# Patient Record
Sex: MALE | NOT HISPANIC OR LATINO | URBAN - METROPOLITAN AREA
[De-identification: names, ages, dates, MRNs, and addresses within clinical notes are randomized per-mention and may not be internally consistent; named-entity substitution may affect disease eponyms.]

---

## 2021-10-23 ENCOUNTER — EMERGENCY (EMERGENCY)
Facility: HOSPITAL | Age: 46
LOS: 1 days | Discharge: HOME | End: 2021-10-23
Attending: EMERGENCY MEDICINE
Payer: SELF-PAY

## 2021-10-23 VITALS
HEIGHT: 67 IN | OXYGEN SATURATION: 98 % | WEIGHT: 145.06 LBS | TEMPERATURE: 98 F | RESPIRATION RATE: 18 BRPM | DIASTOLIC BLOOD PRESSURE: 103 MMHG | SYSTOLIC BLOOD PRESSURE: 199 MMHG | HEART RATE: 93 BPM

## 2021-10-23 VITALS
OXYGEN SATURATION: 99 % | SYSTOLIC BLOOD PRESSURE: 165 MMHG | DIASTOLIC BLOOD PRESSURE: 91 MMHG | HEART RATE: 81 BPM | RESPIRATION RATE: 18 BRPM

## 2021-10-23 PROCEDURE — 99284 EMERGENCY DEPT VISIT MOD MDM: CPT

## 2021-10-23 PROCEDURE — 99283 EMERGENCY DEPT VISIT LOW MDM: CPT

## 2021-10-23 RX ORDER — OXYCODONE HYDROCHLORIDE 5 MG/1
5 TABLET ORAL ONCE
Refills: 0 | Status: DISCONTINUED | OUTPATIENT
Start: 2021-10-23 | End: 2021-10-23

## 2021-10-23 RX ORDER — OXYCODONE HYDROCHLORIDE 5 MG/1
1 TABLET ORAL
Qty: 5 | Refills: 0
Start: 2021-10-23 | End: 2021-10-23

## 2021-10-23 RX ORDER — ACETAMINOPHEN 500 MG
650 TABLET ORAL ONCE
Refills: 0 | Status: COMPLETED | OUTPATIENT
Start: 2021-10-23 | End: 2021-10-23

## 2021-10-23 RX ORDER — CHLORHEXIDINE GLUCONATE 213 G/1000ML
15 SOLUTION TOPICAL
Qty: 450 | Refills: 0
Start: 2021-10-23 | End: 2021-11-06

## 2021-10-23 RX ORDER — IBUPROFEN 200 MG
600 TABLET ORAL ONCE
Refills: 0 | Status: COMPLETED | OUTPATIENT
Start: 2021-10-23 | End: 2021-10-23

## 2021-10-23 RX ADMIN — OXYCODONE HYDROCHLORIDE 5 MILLIGRAM(S): 5 TABLET ORAL at 19:16

## 2021-10-23 RX ADMIN — Medication 650 MILLIGRAM(S): at 19:17

## 2021-10-23 RX ADMIN — Medication 600 MILLIGRAM(S): at 22:15

## 2021-10-23 RX ADMIN — Medication 1 TABLET(S): at 22:06

## 2021-10-23 NOTE — ED PROVIDER NOTE - NS ED ROS FT
CONSTITUTIONAL: No fevers, no chills, no lightheadedness, no dizziness  EARS: no ear drainage, no ear pain, no change in hearing  NOSE: no nasal congestion  MOUTH/THROAT: +R lower molar pain, no sore throat  CV: No chest pain, no palpitations  RESP: No SOB, no cough  GI: No n/v  SKIN: no rashes, no facial swelling   NEURO: no headache, no decreased sensation/paresthesias

## 2021-10-23 NOTE — ED ADULT NURSE NOTE - OBJECTIVE STATEMENT
45 YO male no pertinent medical hx c/o R-sided dental pain. Patient reports that pain has been going on since Tuesday, has not seen a dentist yet. Reports that pain increased in severity today, took a total of 6 advil today, last at approx 1630 with no relief. denies chest pain, SOB, HA, N/V/D, abdominal pain, fever/chills, urinary symptoms, hematuria, weakness, dizziness, numbness, tinging. Peripheral pulses present b/l. Skin warm, dry and pink. Pt placed in position of comfort. Pt educated on call bell system and provided call bell. Bed in lowest position, wheels locked, appropriate side rails raised. Pt denies needs at this time.

## 2021-10-23 NOTE — ED PROVIDER NOTE - PROGRESS NOTE DETAILS
Attending MD Carrero: Dental in Emergency Department to see patient Attending MD Carrero: Will Rx 1 day of Oxycodone, patient to follow up with dental on Monday.  First dose Augmentin in Emergency Department.  Stable for discharge. Follow up instructions given, importance of follow up emphasized, return to ED parameters reviewed and patient verbalized understanding.  All questions answered, all concerns addressed. Thalia MALDONADO (PGY-3): Spoke with dental, does not think dental block would be beneficial for patient's pain, recommends oral medication and close follow-up with dental for pain control. Also does not recommend block in setting of concern for gingival infection, will withhold dental block at this time and treat with oral pain medications. Attending MD Carrero: Seen by Dental, recommend Augmentin, Peridex and dental on Monday

## 2021-10-23 NOTE — ED PROVIDER NOTE - CLINICAL SUMMARY MEDICAL DECISION MAKING FREE TEXT BOX
46y M w/ PMHx GERD presenting with one week of R lower molar pain that has been progressively worsening. +erythema and mild bleeding to base of gingiva to teeth 30-32 with no fluctuance, but tender to palpation. C/f pulpitis vs. gingivitis. Will treat pain with oral pain medication and will consult dental to assess for possible gingival infection requiring antibiotics.

## 2021-10-23 NOTE — ED PROVIDER NOTE - ATTENDING CONTRIBUTION TO CARE
Attending MD Carrero: I personally have seen and examined this patient.  Resident note reviewed and agree on plan of care and except where noted.  See below for details.     Seen in [facility]    46M with PMH/PSH including GERD presents to the ED with dental pain at R lower jaw.  Reports has not seen a dentist in about 15 yrs.  Reports has been having about a week of dental pain.  Reports has attempted Ibuprofen with transient relief but reports pain worsened so presents to the Emergency Department.  Denies fevers, chills.      TO BE COMPLETED Attending MD Carrero: I personally have seen and examined this patient.  Resident note reviewed and agree on plan of care and except where noted.  See below for details.     Seen in [facility]    46M with PMH/PSH including GERD presents to the ED with dental pain at R lower jaw.  Reports has not seen a dentist in about 15 yrs.  Reports has been having about a week of dental pain.  Reports has attempted Ibuprofen with transient relief but reports pain worsened so presents to the Emergency Department.  Denies fevers, chills.  Denies neck pain, inability to open and close jaw, malocclusion, difficulty swallowing, shortness of breath.  A ten (10) point review of systems was negative other than as stated in the HPI or elsewhere in the chart.     Exam:   General: NAD  HENT: head NCAT, airway patent   Chest: symmetric chest rise, no increased work of breathing  MSK: ranging neck freely  Neuro: moving all extremities spontaneously, sensory grossly intact, no gross neuro deficits  Psych: normal mood and affect Attending MD Carrero: I personally have seen and examined this patient.  Resident note reviewed and agree on plan of care and except where noted.  See below for details.     Seen in [facility]    46M with PMH/PSH including GERD presents to the ED with dental pain at R lower jaw.  Reports has not seen a dentist in about 15 yrs.  Reports has been having about a week of dental pain.  Reports has attempted Ibuprofen with transient relief but reports pain worsened so presents to the Emergency Department.  Denies fevers, chills.  Denies neck pain, inability to open and close jaw, malocclusion, difficulty swallowing, shortness of breath.  A ten (10) point review of systems was negative other than as stated in the HPI or elsewhere in the chart.     Exam:   General: NAD  HENT: head NCAT, airway patent, pain to percussion and palpation teeth# 29-30-31, no fluctuance at buccal or lingual gingiva, +caries, generalized buccal gingival erythema  Chest: symmetric chest rise, no increased work of breathing  MSK: ranging neck freely  Neuro: moving all extremities spontaneously, sensory grossly intact, no gross neuro deficits  Psych: normal mood and affect    A/P: 46M with dentalgia, ?superimposed gingival infection, will give pain control, consult dental

## 2021-10-23 NOTE — ED PROVIDER NOTE - NSFOLLOWUPINSTRUCTIONS_ED_ALL_ED_FT
YOU WERE SEEN IN THE ED FOR: right lower jaw pain    WHILE YOU WERE HERE, YOU were evaluated by the dental team and were found to have gingival (gum) disease and had concern for an area of infection.  While here, you were started on an antibiotic (AUGMENTIN - first dose here) and were given Tylenol 650mg, Motrin 600mg and Oxycodone 5mg.    YOU WERE PRESCRIBED: Oxycodone (for severe pain) and Augmentin (antibiotic)  FOLLOW THE INSTRUCTIONS ON THE LABEL/CONTAINER    FOR PAIN, YOU MAY TAKE TYLENOL (Acetaminophen) AND IBUPROFEN (Advil or Motrin). FOLLOW THE INSTRUCTIONS ON THE LABEL/CONTAINER.  You can take 1000 mg of Tylenol every 8 hours and/or 600mg of Ibuprofen every 6 to 8 hours.      PLEASE FOLLOW UP WITH THE DENTAL CLINIC ON MONDAY 10/25/21.  BRING COPIES OF YOUR DISCHARGE INSTRUCTIONS.    RETURN TO THE EMERGENCY DEPARTMENT IF YOU EXPERIENCE ANY NEW/CONCERNING/WORSENING SYMPTOMS SUCH AS BUT NOT LIMITED TO: fevers, chills, difficulty opening or closing your mouth, difficulty swallowing, difficulty breathing, neck swelling, change in voice or any other concerns. YOU WERE SEEN IN THE ED FOR: right lower jaw pain    WHILE YOU WERE HERE, YOU were evaluated by the dental team and were found to have gingival (gum) disease and had concern for an area of infection.  While here, you were started on an antibiotic (AUGMENTIN - first dose here) and were given Tylenol 650mg, Motrin 600mg and Oxycodone 5mg.    YOU WERE PRESCRIBED: Oxycodone (for severe pain) and Augmentin (antibiotic) and Peridex (oral rinse)  FOLLOW THE INSTRUCTIONS ON THE LABEL/CONTAINER    FOR PAIN, YOU MAY TAKE TYLENOL (Acetaminophen) AND IBUPROFEN (Advil or Motrin). FOLLOW THE INSTRUCTIONS ON THE LABEL/CONTAINER.  You can take 1000 mg of Tylenol every 8 hours and/or 600mg of Ibuprofen every 6 to 8 hours.      PLEASE FOLLOW UP WITH THE DENTAL CLINIC ON MONDAY 10/25/21.  BRING COPIES OF YOUR DISCHARGE INSTRUCTIONS.    RETURN TO THE EMERGENCY DEPARTMENT IF YOU EXPERIENCE ANY NEW/CONCERNING/WORSENING SYMPTOMS SUCH AS BUT NOT LIMITED TO: fevers, chills, difficulty opening or closing your mouth, difficulty swallowing, difficulty breathing, neck swelling, change in voice or any other concerns.

## 2021-10-23 NOTE — ED PROVIDER NOTE - PHYSICAL EXAMINATION
Physical Exam:  Gen: Uncomfortable appearing   Head: NCAT  HEENT: normal conjunctiva, tongue midline, oral mucosa moist, poor dentition, +erythematous gingiva surrounding tooth 30-32 with pain to palpation along gum-line, no fluctuance   Lung: CTAB, no respiratory distress, no wheezes/rhonchi/rales B/L, speaking in full sentences  CV: RRR, no murmurs, rubs or gallops  MSK: no TMJ pain, no tenderness to palpation along external jaw   Neuro: No focal sensory or motor deficits  Skin: no swelling, no erythema   Psych: normal affect, calm  Petra Vásquez D.O. -Resident

## 2021-10-23 NOTE — ED PROVIDER NOTE - OBJECTIVE STATEMENT
46y M w/ PMHx GERD presenting with one week of R lower molar pain that has been progressively worsening. Has been taking Advil at home, but denies improvement. States last dose of Advil was at noon. States he does not have a dentist he follows with. Pain worsened with eating, has only been able to tolerate liquids and soft food over past two days. Denies associated fever, chills, purulent drainage from gums, jaw pain, neck pain, tinnitus, ear pain, chest pain or sob. Denies previous hx of similar pain, states he has had a tooth extraction to tooth 18 "years ago, but I don't remember why".

## 2021-10-23 NOTE — CONSULT NOTE ADULT - SUBJECTIVE AND OBJECTIVE BOX
Patient is a 46y old  Male who presents with a chief complaint of right sided dental pain    HPI:      PAST MEDICAL & SURGICAL HISTORY:    (   ) heart valve replacement  (   ) joint replacement  (   ) pregnancy    MEDICATIONS  (STANDING):    MEDICATIONS  (PRN):      Allergies    No Known Allergies    Intolerances        FAMILY HISTORY:      *SOCIAL HISTORY: (guardian or who pt came with), (smoking hx)    *Last Dental Visit:    Vital Signs Last 24 Hrs  T(C): 36.6 (23 Oct 2021 17:39), Max: 36.6 (23 Oct 2021 17:39)  T(F): 97.9 (23 Oct 2021 17:39), Max: 97.9 (23 Oct 2021 17:39)  HR: 81 (23 Oct 2021 22:16) (81 - 93)  BP: 165/91 (23 Oct 2021 22:16) (165/91 - 199/103)  BP(mean): --  RR: 18 (23 Oct 2021 22:16) (18 - 18)  SpO2: 99% (23 Oct 2021 22:16) (98% - 99%)    EOE:  TMJ (  - ) clicks                    ( -   ) pops                    (  -  ) crepitus             Mandible FROM             Facial bones and MOM grossly intact             ( -  ) trismus             (  - ) LAD             (  - ) swelling             (  - ) asymmetry             (  - ) palpation             (  - ) SOB             (  - ) dysphagia             (  - ) LOC    IOE:  permanent dentition: multiple carious teeth and multiple missing teeth           hard/soft palate: WNL           tongue/FOM WNL           labial/buccal mucosa : generalized erythema on buccal gingiva, heavy plaque and calculus  Pain on #29-30           (  + ) percussion           (  + ) palpation           (  - ) swelling     Mobility: no mobility  Caries: 29 and 30    Radiographs: Panoramic radiograph taken - caries noted on #29 and #30, small PARL noted on #30      ASSESSMENT: pain likely from caries on #29 and #30, intraoral exam shows no swelling, fluctuance, or signs of active infection  Pain on percussion of #29 and #30    Med team informed they may do block in region to help patient with pain, along with tylenol and motrin     RECOMMENDATIONS:   1) Pain meds per med team  2) Dental F/U with outpatient dentist for comprehensive dental care.   3) peridex for generalized gingivitis  4) antibotics per med team     Abraham Johnson, pager #[phone]

## 2022-09-15 NOTE — ED PROVIDER NOTE - PATIENT PORTAL LINK FT
You can access the FollowMyHealth Patient Portal offered by [facility] by registering at the following website: [email or web address]. By joining [employer]’s FollowMyHealth portal, you will also be able to view your health information using other applications (apps) compatible with our system. Laceration Care, Adult    NOTE: Please have your sutures removed in 10-12 days.     A laceration is a cut that may go through all layers of the skin and into the tissue that is right under the skin. Some lacerations heal on their own. Others need to be closed with stitches (sutures), staples, skin adhesive strips, or skin glue.    Proper care of a laceration reduces the risk for infection, helps the laceration heal better, and may prevent scarring.      General tips    •Keep the wound clean and dry.      • Do not scratch or pick at the wound.      •Wash your hands with soap and water for at least 20 seconds before and after touching your wound or changing your bandage (dressing). If soap and water are not available, use hand .      • Do not usedisinfectants or antiseptics, such as rubbing alcohol, to clean your wound unless told by your health care provider.      •If you were given a dressing, you should change it at least once a day, or as told by your health care provider. You should also change it if it becomes wet or dirty.        How to care for your laceration    If sutures or staples were used:     •Keep the wound completely dry for the first 24 hours, or as told by your health care provider. After that time, you may shower or bathe. Do not soak your wound in water until after the sutures or staples have been removed.    •Clean the wound once each day, or as told by your health care provider. To do this:  •Wash the wound with soap and water.      •Rinse the wound with water to remove all soap.      •Pat the wound dry with a clean towel. Do not rub the wound.        •After cleaning the wound, apply a thin layer of antibiotic ointment, other topical ointments, or a non-adherent dressing as told by your health care provider. This will help prevent infection and keep the dressing from sticking to the wound.      •Have the sutures or staples removed as told by your health care provider. Do not  remove sutures or staples yourself.      If skin adhesive strips were used:     • Do not get the skin adhesive strips wet. You may shower or bathe, but keep the wound dry.      •If the wound gets wet, pat it dry with a clean towel. Do not rub the wound.      •Skin adhesive strips fall off on their own. If adhesive strip edges start to loosen and curl up, you may trim the loose edges. Do not remove adhesive strips completely unless your health care provider tells you to do that.      If skin glue was used:     •You may shower or bathe, but try to keep the wound dry. Do not soak the wound in water.      •After showering or bathing, pat the wound dry with a clean towel. Do not rub the wound.      • Do not do any activities that will make you sweat a lot until the skin glue has fallen off.      • Do not apply liquid, cream, or ointment medicine to the wound while the skin glue is in place. Doing this may loosen the film before the wound has healed.      •If a dressing is placed over the wound, do not apply tape directly over the skin glue. Doing this may cause the glue to be pulled off before the wound has healed.      • Do not pick at the glue. Skin glue usually remains in place for 5–10 days and then falls off the skin.        Follow these instructions at home:    Medicines     •Take over-the-counter and prescription medicines only as told by your health care provider.      •If you were prescribed an antibiotic medicine or ointment, take or apply it as told by your health care provider. Do not stop using it even if your condition improves.      Managing pain and swelling   •If directed, put ice on the injured area. To do this:  •Put ice in a plastic bag.      •Place a towel between your skin and the bag.      •Leave the ice on for 20 minutes, 2–3 times a day.      •Remove the ice if your skin turns bright red. This is very important. If you cannot feel pain, heat, or cold, you have a greater risk of damage to the area.        •Raise (elevate) the injured area above the level of your heart while you are sitting or lying down for the first 24–48 hours after the laceration is repaired.        General instructions   Two wounds closed with skin glue. One is normal. The other is red with pus and infected.   •Avoid any activity that could cause your wound to reopen.    •Check your wound every day for signs of infection. Watch for:  •More redness, swelling, or pain.      •Fluid or blood.      •Warmth.      •Pus or a bad smell.        •Keep all follow-up visits. This is important.        Contact a health care provider if:    •You received a tetanus shot and you have swelling, severe pain, redness, or bleeding at the injection site.      •Your closed wound breaks open.    •You have any of these signs of infection:  •More redness, swelling, or pain around your wound.      •Fluid or blood coming from your wound.      •Warmth coming from your wound.      •Pus or a bad smell coming from your wound.      •A fever.        •You notice something coming out of the wound, such as wood or glass.      •Your pain is not controlled with medicine.      •You notice a change in the color of your skin near your wound.      •You need to change the dressing often.      •You develop a new rash.      •You have numbness around the wound.        Get help right away if:    •You develop severe swelling around the wound.      •Your pain suddenly increases and is severe.      •You develop painful lumps near the wound or on skin anywhere else on your body.      •You have a red streak going away from your wound.      •The wound is on your hand or foot, and you cannot properly move a finger or toe.      •The wound is on your hand or foot, and you notice that your fingers or toes look pale or bluish.        Summary    •A laceration is a cut that may go through all layers of the skin and into the tissue that is right under the skin.      •Some lacerations heal on their own. Others need to be closed with stitches (sutures), staples, skin adhesive strips, or skin glue.      •Proper care of a laceration reduces the risk of infection, helps the laceration heal better, and may prevent scarring.      This information is not intended to replace advice given to you by your health care provider. Make sure you discuss any questions you have with your health care provider.